# Patient Record
Sex: MALE | Race: AMERICAN INDIAN OR ALASKA NATIVE
[De-identification: names, ages, dates, MRNs, and addresses within clinical notes are randomized per-mention and may not be internally consistent; named-entity substitution may affect disease eponyms.]

---

## 2019-07-17 ENCOUNTER — HOSPITAL ENCOUNTER (OUTPATIENT)
Dept: HOSPITAL 5 - OR | Age: 49
Discharge: HOME | End: 2019-07-17
Attending: PLASTIC SURGERY
Payer: MEDICARE

## 2019-07-17 VITALS — SYSTOLIC BLOOD PRESSURE: 124 MMHG | DIASTOLIC BLOOD PRESSURE: 81 MMHG

## 2019-07-17 DIAGNOSIS — Z79.899: ICD-10-CM

## 2019-07-17 DIAGNOSIS — Z72.89: ICD-10-CM

## 2019-07-17 DIAGNOSIS — L72.3: ICD-10-CM

## 2019-07-17 DIAGNOSIS — L72.0: Primary | ICD-10-CM

## 2019-07-17 DIAGNOSIS — Z98.890: ICD-10-CM

## 2019-07-17 PROCEDURE — 88304 TISSUE EXAM BY PATHOLOGIST: CPT

## 2019-07-17 PROCEDURE — 11406 EXC TR-EXT B9+MARG >4.0 CM: CPT

## 2019-07-17 NOTE — ANESTHESIA CONSULTATION
Anesthesia Consult and Med Hx


Date of service: 07/17/19





- Airway


Anesthetic Teeth Evaluation: Good


ROM Head & Neck: Adequate


Mental/Hyoid Distance: Adequate


Mallampati Class: Class II


Intubation Access Assessment: Probably Good





- Pre-Operative Health Status


ASA Pre-Surgery Classification: ASA3


Proposed Anesthetic Plan: General





- Pulmonary


Hx Smoking: No


Hx Sleep Apnea: No





- Central Nervous System


Hx Psychiatric Problems: No





- Hematic


Hx Sickle Cell Disease: No





- Other Systems


Hx Alcohol Use: Yes (Occas)


Hx Substance Use: Yes (Marijuana daily)


Hx Cancer: No





- Additional Comments


Anesthesia Medical History Comments: HIV+

## 2019-07-17 NOTE — OPERATIVE REPORT
Operative Report


Operative Report: 





Plastic Surgery Operative Note





Preoperative Diagnosis:  Multiple sebaceous cysts of the buttocks


Postoperative Diagnosis:  Same


Procedure:  Excision of multiple sebaceous cysts of the buttocks


Surgeon:  Nury Reese MD


Assistant:  JONATHON Comer


Anesthesia:  General


EBL: Minimal








Indications:  This patient is a 48-year-old -American male who presented 

with complaint of a long-standing history of multiple sebaceous cysts of the 

back and buttocks and trunk.  He stated that he had incision and drainage done 

by dermatology for several cysts that have become infected in the past.  He's 

also had excision performed in the past and is looking to have lesions on his 

buttocks which have recently flared up excised.





Procedure: After review of pertinent history and physical exam findings patient 

was brought into the operating room and intubated on the stretcher after which 

point he was placed prone on the OR table.  The lower back and buttocks were 

prepped and draped in usual sterile surgical fashion.  To begin, the most 

pronounced and therefore logically the most bothersome cysts near the gluteal 

cleft and on the lower buttocks were identified and marked for excision.  1% 

lidocaine with epinephrine was injected into each of the cysts and enough time 

was allowed for epinephrine effect.  Using a #15 blade, all cysts were excised 

sharply to include the cyst capsules.  In all, 21 cysts were removed and sent 

for frozen section.  We then began closure using 3-0 Monocryl buried interrupted

sutures.  Dermabond was used for skin.  Patient was then transferred to the 

stretcher and extubated without difficulty.  He was then transferred to the 

recovery room in stable condition.  All sponge needle and instrument counts were

correct at the end of the case.

## 2020-05-28 ENCOUNTER — SEE NOTE (OUTPATIENT)
Dept: URBAN - METROPOLITAN AREA CLINIC 36 | Facility: CLINIC | Age: 50
Setting detail: DERMATOLOGY
End: 2020-05-28

## 2020-05-28 ENCOUNTER — RX ONLY (RX ONLY)
Age: 50
End: 2020-05-28

## 2020-05-28 DIAGNOSIS — D48.5 NEOPLASM OF UNCERTAIN BEHAVIOR OF SKIN: ICD-10-CM

## 2020-05-28 PROBLEM — D17.22 BENIGN LIPOMATOUS NEOPLASM OF SKIN AND SUBCUTANEOUS TISSUE OF LEFT ARM: Status: RESOLVED | Noted: 2020-05-28

## 2020-05-28 PROBLEM — D17.22 BENIGN LIPOMATOUS NEOPLASM OF SKIN AND SUBCUTANEOUS TISSUE OF LEFT ARM: Status: ACTIVE | Noted: 2020-05-28

## 2020-05-28 PROCEDURE — 11310 SHAVE SKIN LESION 0.5 CM/<: CPT

## 2020-05-28 PROCEDURE — 17110 DESTRUCT B9 LESION 1-14: CPT

## 2020-06-11 ENCOUNTER — RX ONLY (RX ONLY)
Age: 50
End: 2020-06-11

## 2020-06-11 ENCOUNTER — FOLLOW UP (OUTPATIENT)
Dept: URBAN - METROPOLITAN AREA CLINIC 36 | Facility: CLINIC | Age: 50
Setting detail: DERMATOLOGY
End: 2020-06-11

## 2020-06-11 DIAGNOSIS — L63.8 OTHER ALOPECIA AREATA: ICD-10-CM

## 2020-06-11 DIAGNOSIS — L64.8 OTHER ANDROGENIC ALOPECIA: ICD-10-CM

## 2020-06-11 PROBLEM — D17.21 BENIGN LIPOMATOUS NEOPLASM OF SKIN AND SUBCUTANEOUS TISSUE OF RIGHT ARM: Status: RESOLVED | Noted: 2020-06-11

## 2020-06-11 PROBLEM — D17.22 BENIGN LIPOMATOUS NEOPLASM OF SKIN AND SUBCUTANEOUS TISSUE OF LEFT ARM: Status: RESOLVED | Noted: 2020-06-11

## 2020-06-11 PROBLEM — D17.22 BENIGN LIPOMATOUS NEOPLASM OF SKIN AND SUBCUTANEOUS TISSUE OF LEFT ARM: Status: ACTIVE | Noted: 2020-06-11

## 2020-06-11 PROBLEM — D17.21 BENIGN LIPOMATOUS NEOPLASM OF SKIN AND SUBCUTANEOUS TISSUE OF RIGHT ARM: Status: ACTIVE | Noted: 2020-06-11

## 2020-06-11 PROCEDURE — 17110 DESTRUCT B9 LESION 1-14: CPT

## 2020-06-11 PROCEDURE — 99213 OFFICE O/P EST LOW 20 MIN: CPT

## 2020-06-11 RX ORDER — IMIQUIMOD 50 MG/G
1 APPLICATION CREAM TOPICAL DAILY
Qty: 12 | Refills: 3
Start: 2020-06-11

## 2020-07-08 ENCOUNTER — WART (OUTPATIENT)
Dept: URBAN - METROPOLITAN AREA CLINIC 36 | Facility: CLINIC | Age: 50
Setting detail: DERMATOLOGY
End: 2020-07-08

## 2020-07-08 DIAGNOSIS — L82.0 INFLAMED SEBORRHEIC KERATOSIS: ICD-10-CM

## 2020-07-08 PROCEDURE — 17111 DESTRUCT LESION 15 OR MORE: CPT

## 2020-07-29 ENCOUNTER — FOLLOW UP (OUTPATIENT)
Dept: URBAN - METROPOLITAN AREA CLINIC 36 | Facility: CLINIC | Age: 50
Setting detail: DERMATOLOGY
End: 2020-07-29

## 2020-07-29 DIAGNOSIS — Z80.8 FAMILY HISTORY OF MALIGNANT NEOPLASM OF OTHER ORGANS OR SYSTEMS: ICD-10-CM

## 2020-07-29 DIAGNOSIS — D23.9 OTHER BENIGN NEOPLASM OF SKIN, UNSPECIFIED: ICD-10-CM

## 2020-07-29 DIAGNOSIS — L57.8 OTHER SKIN CHANGES DUE TO CHRONIC EXPOSURE TO NONIONIZING RADIATION: ICD-10-CM

## 2020-07-29 PROBLEM — D17.23 BENIGN LIPOMATOUS NEOPLASM OF SKIN AND SUBCUTANEOUS TISSUE OF RIGHT LEG: Status: ACTIVE | Noted: 2020-07-29

## 2020-07-29 PROBLEM — D17.24 BENIGN LIPOMATOUS NEOPLASM OF SKIN AND SUBCUTANEOUS TISSUE OF LEFT LEG: Status: RESOLVED | Noted: 2020-07-29

## 2020-07-29 PROBLEM — D17.24 BENIGN LIPOMATOUS NEOPLASM OF SKIN AND SUBCUTANEOUS TISSUE OF LEFT LEG: Status: ACTIVE | Noted: 2020-07-29

## 2020-07-29 PROBLEM — D17.23 BENIGN LIPOMATOUS NEOPLASM OF SKIN AND SUBCUTANEOUS TISSUE OF RIGHT LEG: Status: RESOLVED | Noted: 2020-07-29

## 2020-07-29 PROCEDURE — 99213 OFFICE O/P EST LOW 20 MIN: CPT

## 2020-07-29 PROCEDURE — 11901 INJECT SKIN LESIONS >7: CPT

## 2020-08-31 ENCOUNTER — FOLLOW UP (OUTPATIENT)
Dept: URBAN - METROPOLITAN AREA CLINIC 36 | Facility: CLINIC | Age: 50
Setting detail: DERMATOLOGY
End: 2020-08-31

## 2020-08-31 DIAGNOSIS — Z41.9 ENCOUNTER FOR PROCEDURE FOR PURPOSES OTHER THAN REMEDYING HEALTH STATE, UNSPECIFIED: ICD-10-CM

## 2020-08-31 PROBLEM — L85.3 XEROSIS CUTIS: Status: ACTIVE | Noted: 2020-08-31

## 2020-08-31 PROBLEM — L85.3 XEROSIS CUTIS: Status: RESOLVED | Noted: 2020-08-31

## 2020-08-31 PROCEDURE — 99212 OFFICE O/P EST SF 10 MIN: CPT

## 2020-08-31 PROCEDURE — 17110 DESTRUCT B9 LESION 1-14: CPT

## 2022-04-30 ENCOUNTER — TELEPHONE ENCOUNTER (OUTPATIENT)
Dept: URBAN - METROPOLITAN AREA CLINIC 121 | Facility: CLINIC | Age: 52
End: 2022-04-30

## 2022-05-01 ENCOUNTER — TELEPHONE ENCOUNTER (OUTPATIENT)
Dept: URBAN - METROPOLITAN AREA CLINIC 121 | Facility: CLINIC | Age: 52
End: 2022-05-01

## 2022-05-01 RX ORDER — FLUTICASONE PROPIONATE 50 UG/1
2 SPRAYS  EACH NOSTRIL SPRAY, METERED NASAL
Status: ACTIVE | COMMUNITY
Start: 2015-06-16

## 2022-05-01 RX ORDER — CYCLOBENZAPRINE HYDROCHLORIDE 10 MG/1
1 TID TABLET, FILM COATED ORAL
Status: ACTIVE | COMMUNITY
Start: 2015-06-16

## 2022-05-01 RX ORDER — OMEPRAZOLE 40 MG/1
QD CAPSULE, DELAYED RELEASE ORAL
Status: ACTIVE | COMMUNITY
Start: 2015-06-16

## 2022-05-01 RX ORDER — FLUCONAZOLE 200 MG/1
1 TABLET PO QD X 10 DAYS TABLET ORAL
Status: ACTIVE | COMMUNITY
Start: 2015-08-10

## 2022-05-01 RX ORDER — MINOCYCLINE HYDROCHLORIDE 100 MG/1
BID TABLET ORAL
Status: ACTIVE | COMMUNITY
Start: 2015-06-16

## 2022-05-01 RX ORDER — SULFAMETHOXAZOLE/TRIMETHOPRIM 400MG-80MG
QD TABLET ORAL
Status: ACTIVE | COMMUNITY
Start: 2015-06-16

## 2022-05-01 RX ORDER — CLARITHROMYCIN 500 MG/1
1 TABLET PO BID X 14 DAYS TABLET ORAL
Status: ACTIVE | COMMUNITY
Start: 2015-07-30

## 2022-05-01 RX ORDER — LANSOPRAZOLE 30 MG/1
1 CAPSULE PO BID X 14 DAYS CAPSULE, DELAYED RELEASE ORAL
Status: ACTIVE | COMMUNITY
Start: 2015-07-30

## 2022-05-01 RX ORDER — AMOXICILLIN 500 MG/1
2 TABLETS PO BID X 14 DAYS TABLET, FILM COATED ORAL
Status: ACTIVE | COMMUNITY
Start: 2015-07-30

## 2022-05-01 RX ORDER — PREDNISONE 10 MG/1
QD TABLET ORAL
Status: ACTIVE | COMMUNITY
Start: 2015-06-16

## 2022-05-01 RX ORDER — CHLORPROMAZINE HYDROCHLORIDE 25 MG/1
1 Q8H TABLET, SUGAR COATED ORAL
Status: ACTIVE | COMMUNITY
Start: 2015-06-16

## 2024-08-22 ENCOUNTER — OFFICE VISIT (OUTPATIENT)
Dept: URBAN - METROPOLITAN AREA CLINIC 92 | Facility: CLINIC | Age: 54
End: 2024-08-22

## 2024-09-18 ENCOUNTER — OFFICE VISIT (OUTPATIENT)
Dept: URBAN - METROPOLITAN AREA CLINIC 92 | Facility: CLINIC | Age: 54
End: 2024-09-18

## 2024-09-18 RX ORDER — CLARITHROMYCIN 500 MG/1
1 TABLET PO BID X 14 DAYS TABLET ORAL
COMMUNITY
Start: 2015-07-30

## 2024-09-18 RX ORDER — FLUCONAZOLE 200 MG/1
1 TABLET PO QD X 10 DAYS TABLET ORAL
COMMUNITY
Start: 2015-08-10

## 2024-09-18 RX ORDER — OMEPRAZOLE 40 MG/1
QD CAPSULE, DELAYED RELEASE ORAL
COMMUNITY
Start: 2015-06-16

## 2024-09-18 RX ORDER — AMOXICILLIN 500 MG/1
2 TABLETS PO BID X 14 DAYS TABLET, FILM COATED ORAL
COMMUNITY
Start: 2015-07-30

## 2024-09-18 RX ORDER — SULFAMETHOXAZOLE/TRIMETHOPRIM 400MG-80MG
QD TABLET ORAL
COMMUNITY
Start: 2015-06-16

## 2024-09-18 RX ORDER — CHLORPROMAZINE HYDROCHLORIDE 25 MG/1
1 Q8H TABLET, SUGAR COATED ORAL
COMMUNITY
Start: 2015-06-16

## 2024-09-18 RX ORDER — CYCLOBENZAPRINE HYDROCHLORIDE 10 MG/1
1 TID TABLET, FILM COATED ORAL
COMMUNITY
Start: 2015-06-16

## 2024-09-18 RX ORDER — MINOCYCLINE HYDROCHLORIDE 100 MG/1
BID TABLET ORAL
COMMUNITY
Start: 2015-06-16

## 2024-09-18 RX ORDER — LANSOPRAZOLE 30 MG/1
1 CAPSULE PO BID X 14 DAYS CAPSULE, DELAYED RELEASE ORAL
COMMUNITY
Start: 2015-07-30

## 2024-09-18 RX ORDER — FLUTICASONE PROPIONATE 50 UG/1
2 SPRAYS  EACH NOSTRIL SPRAY, METERED NASAL
COMMUNITY
Start: 2015-06-16

## 2024-09-18 RX ORDER — PREDNISONE 10 MG/1
QD TABLET ORAL
COMMUNITY
Start: 2015-06-16

## 2024-09-18 NOTE — HPI-TODAY'S VISIT:
Patient was referred by  *** for colon cancer screening. A copy of this document will be sent to the provider.  Patient presents for initial screening colonoscopy. Also has a noted history of heartburn and takes Omeprazole 40 at home.  Denies any prior examination. Denies family history of colon cancer. Denies any symptoms of concern. No changes in bowel habits, melena, rectal bleeding, abdominal pain, or unintentional weight loss. Patient denies cardiopulmonary disease and does not have a pacemaker or defibrillator. Denies use of anticoagulants or antiplatelets. Recent labs and imaging were unremarkable.

## 2024-09-26 ENCOUNTER — DASHBOARD ENCOUNTERS (OUTPATIENT)
Age: 54
End: 2024-09-26

## 2024-09-27 ENCOUNTER — OFFICE VISIT (OUTPATIENT)
Dept: URBAN - METROPOLITAN AREA TELEHEALTH 2 | Facility: TELEHEALTH | Age: 54
End: 2024-09-27
Payer: MEDICARE

## 2024-09-27 ENCOUNTER — TELEPHONE ENCOUNTER (OUTPATIENT)
Dept: URBAN - METROPOLITAN AREA CLINIC 92 | Facility: CLINIC | Age: 54
End: 2024-09-27

## 2024-09-27 DIAGNOSIS — Z12.11 SPECIAL SCREENING FOR MALIGNANT NEOPLASMS, COLON: ICD-10-CM

## 2024-09-27 DIAGNOSIS — K59.09 CHRONIC CONSTIPATION: ICD-10-CM

## 2024-09-27 PROCEDURE — 99203 OFFICE O/P NEW LOW 30 MIN: CPT | Performed by: PHYSICIAN ASSISTANT

## 2024-09-27 RX ORDER — BICTEGRAVIR SODIUM, EMTRICITABINE, AND TENOFOVIR ALAFENAMIDE FUMARATE 50; 200; 25 MG/1; MG/1; MG/1
1 TABLET TABLET ORAL ONCE A DAY
Status: ACTIVE | COMMUNITY

## 2024-09-27 NOTE — HPI-TODAY'S VISIT:
This patient was referred by Dr. Rishi Nowak  for an evaluation of colonoscopy consult.  A copy of this will be sent to the referring provider. He has never had a colonoscopy. At curt has BMs daily but hard stools. He ate something recently at a picnic and had diarrhea and cramps but that resolved. He saw BRB in his stool months ago but this has resolved on its own. Was multipe episodes without assoc rectal pain. No anorexia or weight loss.  Fiber intake is ok.  He denies any N/V, dyshagia.  He has a hx of GERD, used to be on PPI, off for years. Notes this has improved and now occurs only once every 1-2/weeks as heartburn. No dysphagia.  EGD 2015 [facility] showed candida esophagitis and distal esophageal stricutre, dilated + acute gastritis, bx + axel and HP.  Kan Newby is ID Denies heart or lung dz, no AC

## 2024-11-19 ENCOUNTER — CLAIMS CREATED FROM THE CLAIM WINDOW (OUTPATIENT)
Dept: URBAN - METROPOLITAN AREA SURGERY CENTER 16 | Facility: SURGERY CENTER | Age: 54
End: 2024-11-19
Payer: MEDICARE

## 2024-11-19 ENCOUNTER — CLAIMS CREATED FROM THE CLAIM WINDOW (OUTPATIENT)
Dept: URBAN - METROPOLITAN AREA CLINIC 4 | Facility: CLINIC | Age: 54
End: 2024-11-19
Payer: MEDICARE

## 2024-11-19 ENCOUNTER — TELEPHONE ENCOUNTER (OUTPATIENT)
Dept: URBAN - METROPOLITAN AREA CLINIC 92 | Facility: CLINIC | Age: 54
End: 2024-11-19

## 2024-11-19 DIAGNOSIS — A63.0 ANOGENITAL (VENEREAL) WARTS: ICD-10-CM

## 2024-11-19 DIAGNOSIS — A63.0 CONDYLOMA ACUMINATUM OF ANUS: ICD-10-CM

## 2024-11-19 DIAGNOSIS — Z12.11 ENCOUNTER FOR SCREENING FOR MALIGNANT NEOPLASM OF COLON: ICD-10-CM

## 2024-11-19 PROCEDURE — 45380 COLONOSCOPY AND BIOPSY: CPT | Performed by: INTERNAL MEDICINE

## 2024-11-19 PROCEDURE — 45380 COLONOSCOPY AND BIOPSY: CPT | Performed by: CLINIC/CENTER

## 2024-11-19 PROCEDURE — 00812 ANES LWR INTST SCR COLSC: CPT

## 2024-11-19 PROCEDURE — 88305 TISSUE EXAM BY PATHOLOGIST: CPT | Performed by: PATHOLOGY

## 2024-11-19 RX ORDER — BICTEGRAVIR SODIUM, EMTRICITABINE, AND TENOFOVIR ALAFENAMIDE FUMARATE 50; 200; 25 MG/1; MG/1; MG/1
1 TABLET TABLET ORAL ONCE A DAY
Status: ACTIVE | COMMUNITY

## 2025-01-07 ENCOUNTER — OFFICE VISIT (OUTPATIENT)
Dept: URBAN - METROPOLITAN AREA TELEHEALTH 2 | Facility: TELEHEALTH | Age: 55
End: 2025-01-07
Payer: MEDICARE

## 2025-01-07 VITALS — HEIGHT: 73 IN | WEIGHT: 240 LBS | BODY MASS INDEX: 31.81 KG/M2

## 2025-01-07 DIAGNOSIS — K59.09 CHRONIC CONSTIPATION: ICD-10-CM

## 2025-01-07 DIAGNOSIS — A63.0 ANAL CONDYLOMA: ICD-10-CM

## 2025-01-07 PROBLEM — 240597001: Status: ACTIVE | Noted: 2025-01-07

## 2025-01-07 PROCEDURE — 99213 OFFICE O/P EST LOW 20 MIN: CPT | Performed by: PHYSICIAN ASSISTANT

## 2025-01-07 RX ORDER — BICTEGRAVIR SODIUM, EMTRICITABINE, AND TENOFOVIR ALAFENAMIDE FUMARATE 50; 200; 25 MG/1; MG/1; MG/1
1 TABLET TABLET ORAL ONCE A DAY
Status: ACTIVE | COMMUNITY

## 2025-01-07 NOTE — HPI-TODAY'S VISIT:
54yoM presents for f/u after colonoscopy. He has BMs daily but hard stools.Never started miralax/metamucil. Fiber intake is ok. No hematochezia or melena. Colon 11/2024: Preparation of the colon was fair-Anal condyloma ow normal. Has not been seen by CR[facility]. No FD colon CA or polyps. He has a hx of GERD, used to be on PPI, off for years. Notes this has improved and now occurs only once every 1-2/weeks as heartburn. No dysphagia. EGD 2015 [facility] showed candida esophagitis and distal esophageal stricutre, dilated + acute gastritis, bx + candida and HP.   Kan Newby is ID Denies heart or lung dz, no AC

## 2025-01-14 ENCOUNTER — TELEPHONE ENCOUNTER (OUTPATIENT)
Dept: URBAN - METROPOLITAN AREA CLINIC 23 | Facility: CLINIC | Age: 55
End: 2025-01-14